# Patient Record
Sex: FEMALE | Race: WHITE
[De-identification: names, ages, dates, MRNs, and addresses within clinical notes are randomized per-mention and may not be internally consistent; named-entity substitution may affect disease eponyms.]

---

## 2019-11-10 ENCOUNTER — HOSPITAL ENCOUNTER (INPATIENT)
Dept: HOSPITAL 72 - EMR | Age: 24
LOS: 2 days | Discharge: HOME | DRG: 301 | End: 2019-11-12
Payer: COMMERCIAL

## 2019-11-10 VITALS — BODY MASS INDEX: 21.26 KG/M2 | WEIGHT: 120 LBS | HEIGHT: 63 IN

## 2019-11-10 VITALS — SYSTOLIC BLOOD PRESSURE: 138 MMHG | DIASTOLIC BLOOD PRESSURE: 92 MMHG

## 2019-11-10 DIAGNOSIS — T38.4X5A: ICD-10-CM

## 2019-11-10 DIAGNOSIS — N80.9: ICD-10-CM

## 2019-11-10 DIAGNOSIS — I82.441: Primary | ICD-10-CM

## 2019-11-10 DIAGNOSIS — M79.7: ICD-10-CM

## 2019-11-10 DIAGNOSIS — R06.02: ICD-10-CM

## 2019-11-10 PROCEDURE — 96361 HYDRATE IV INFUSION ADD-ON: CPT

## 2019-11-10 PROCEDURE — 81025 URINE PREGNANCY TEST: CPT

## 2019-11-10 PROCEDURE — 93971 EXTREMITY STUDY: CPT

## 2019-11-10 PROCEDURE — 36415 COLL VENOUS BLD VENIPUNCTURE: CPT

## 2019-11-10 PROCEDURE — 85610 PROTHROMBIN TIME: CPT

## 2019-11-10 PROCEDURE — 94640 AIRWAY INHALATION TREATMENT: CPT

## 2019-11-10 PROCEDURE — 99285 EMERGENCY DEPT VISIT HI MDM: CPT

## 2019-11-10 PROCEDURE — 85730 THROMBOPLASTIN TIME PARTIAL: CPT

## 2019-11-10 PROCEDURE — 80048 BASIC METABOLIC PNL TOTAL CA: CPT

## 2019-11-10 PROCEDURE — 96360 HYDRATION IV INFUSION INIT: CPT

## 2019-11-10 PROCEDURE — 80053 COMPREHEN METABOLIC PANEL: CPT

## 2019-11-10 PROCEDURE — 85025 COMPLETE CBC W/AUTO DIFF WBC: CPT

## 2019-11-10 PROCEDURE — 71275 CT ANGIOGRAPHY CHEST: CPT

## 2019-11-10 PROCEDURE — 96372 THER/PROPH/DIAG INJ SC/IM: CPT

## 2019-11-10 PROCEDURE — 81003 URINALYSIS AUTO W/O SCOPE: CPT

## 2019-11-10 PROCEDURE — 94664 DEMO&/EVAL PT USE INHALER: CPT

## 2019-11-10 NOTE — EMERGENCY ROOM REPORT
History of Present Illness


General


Chief Complaint:  General Complaint


Source:  Patient





Present Illness


HPI


24-year-old female presents ED for evaluation.  States she has been having 

right calf pain for the last 5 days.  Started after a flight.  States she feels 

that the calf is slightly more swollen than the left calf.  Pain is dull, 7 out 

of 10, nonradiating.  Denies any fall or injury.  Denies chest pain or 

shortness of breath.  No other aggravating relieving factors.  Denies any other 

associated symptoms


Allergies:  


Coded Allergies:  


     No Known Allergies (Unverified , 11/10/19)





Patient History


Past Medical History:  none


Past Surgical History:  none


Pertinent Family History:  none


Social History:  Denies: smoking, alcohol use, drug use


Last Menstrual Period:  birth control


Pregnant Now:  No


Immunizations:  UTD


Reviewed Nursing Documentation:  PMH: Agreed; PSxH: Agreed





Nursing Documentation-PMH


Past Medical History:  No History, Except For





Review of Systems


All Other Systems:  negative except mentioned in HPI





Physical Exam





Vital Signs








  Date Time  Temp Pulse Resp B/P (MAP) Pulse Ox O2 Delivery O2 Flow Rate FiO2


 


11/10/19 22:54 98.8 95 16 138/92 (107) 97 Room Air  








Sp02 EP Interpretation:  reviewed, normal


General Appearance:  no apparent distress, alert, GCS 15, non-toxic


Head:  normocephalic


Eyes:  bilateral eye normal inspection, bilateral eye PERRL


ENT:  normal ENT inspection


Neck:  normal inspection


Respiratory:  chest non-tender, lungs clear, normal breath sounds, speaking 

full sentences


Cardiovascular #1:  regular rate, rhythm, no edema


Gastrointestinal:  normal inspection


Rectal:  deferred


Genitourinary:  no CVA tenderness


Musculoskeletal:  calf tenderness - right


Neurologic:  alert, oriented x3, responsive, motor strength/tone normal, 

sensory intact, speech normal


Psychiatric:  normal inspection


Skin:  no rash


Lymphatic:  normal inspection





Medical Decision Making


Diagnostic Impression:  


 Primary Impression:  


 DVT (deep venous thrombosis)


 Qualified Codes:  I82.441 - Acute embolism and thrombosis of right tibial vein


ER Course


Hospital Course 


24-year-old female presents ED with R leg pain and swelling.  





Differential diagnoses include: DVT, cellulitis, contusion, abscess





Clinical course


Patient placed on stretcher after initial history and physical I ordered venous 

Duplex





Venous Duplex shows acute thrombus in R posterior tibialis


Labs reviewed- electrolytes okay, hemoglobin/hematocrit stable, coags ok





Patient given Lovenox in ED. patient will be admitted to Dr Washington's service. 





I.  I feel this is a highly complex case requiring extensive working including 

EKG/Rhythm strip, Xray/CT/US, Blood/urine lab work, repeat exams while in ED, 

and administration of strong opiates/narcotics for pain control, admission to 

hospital or close patient follow up.  





Diagnosis - DVT





admitted to floor in serious condition





Labs








Test


  11/11/19


00:19


 


White Blood Count


  9.6 K/UL


(4.8-10.8)


 


Red Blood Count


  4.84 M/UL


(4.20-5.40)


 


Hemoglobin


  15.2 G/DL


(12.0-16.0)


 


Hematocrit


  42.6 %


(37.0-47.0)


 


Mean Corpuscular Volume 88 FL (80-99) 


 


Mean Corpuscular Hemoglobin


  31.4 PG


(27.0-31.0)


 


Mean Corpuscular Hemoglobin


Concent 35.7 G/DL


(32.0-36.0)


 


Red Cell Distribution Width


  10.9 %


(11.6-14.8)


 


Platelet Count


  313 K/UL


(150-450)


 


Mean Platelet Volume


  6.8 FL


(6.5-10.1)


 


Neutrophils (%) (Auto)


  55.0 %


(45.0-75.0)


 


Lymphocytes (%) (Auto)


  34.1 %


(20.0-45.0)


 


Monocytes (%) (Auto)


  8.5 %


(1.0-10.0)


 


Eosinophils (%) (Auto)


  0.5 %


(0.0-3.0)


 


Basophils (%) (Auto)


  1.9 %


(0.0-2.0)


 


Prothrombin Time


  11.0 SEC


(9.30-11.50)


 


Prothromb Time International


Ratio 1.0 (0.9-1.1) 


 


 


Activated Partial


Thromboplast Time 27 SEC (23-33) 


 


 


Urine Color Pale yellow 


 


Urine Appearance Clear 


 


Urine pH 7 (4.5-8.0) 


 


Urine Specific Gravity


  1.005


(1.005-1.035)


 


Urine Protein


  Negative


(NEGATIVE)


 


Urine Glucose (UA)


  Negative


(NEGATIVE)


 


Urine Ketones


  Negative


(NEGATIVE)


 


Urine Blood


  Negative


(NEGATIVE)


 


Urine Nitrite


  Negative


(NEGATIVE)


 


Urine Bilirubin


  Negative


(NEGATIVE)


 


Urine Urobilinogen


  Normal MG/DL


(0.0-1.0)


 


Urine Leukocyte Esterase


  Negative


(NEGATIVE)


 


Urine HCG, Qualitative


  Negative


(NEGATIVE)


 


Sodium Level


  142 MMOL/L


(136-145)


 


Potassium Level


  3.5 MMOL/L


(3.5-5.1)


 


Chloride Level


  106 MMOL/L


()


 


Carbon Dioxide Level


  29 MMOL/L


(21-32)


 


Anion Gap


  7 mmol/L


(5-15)


 


Blood Urea Nitrogen


  11 mg/dL


(7-18)


 


Creatinine


  0.8 MG/DL


(0.55-1.30)


 


Estimat Glomerular Filtration


Rate > 60 mL/min


(>60)


 


Glucose Level


  89 MG/DL


()


 


Calcium Level


  9.2 MG/DL


(8.5-10.1)


 


Total Bilirubin


  0.5 MG/DL


(0.2-1.0)


 


Aspartate Amino Transf


(AST/SGOT) 15 U/L (15-37) 


 


 


Alanine Aminotransferase


(ALT/SGPT) 20 U/L (12-78) 


 


 


Alkaline Phosphatase


  43 U/L


()


 


Total Protein


  8.1 G/DL


(6.4-8.2)


 


Albumin


  4.3 G/DL


(3.4-5.0)


 


Globulin 3.8 g/dL 


 


Albumin/Globulin Ratio 1.1 (1.0-2.7) 








CT/MRI/US Diagnostic Results


CT/MRI/US Diagnostic Results :  


   Imaging Test Ordered:  Venous Duplex RLE


   Impression


DVT posterior tibialis





Last Vital Signs








  Date Time  Temp Pulse Resp B/P (MAP) Pulse Ox O2 Delivery O2 Flow Rate FiO2


 


11/10/19 23:05  95 16   Room Air  


 


11/10/19 23:05 98.8   138/92 97   








Status:  improved


Disposition:  ADMITTED AS INPATIENT


Condition:  Serious











Chuck Decker MD Nov 10, 2019 23:52 Returned call, no answer unable to leave message

## 2019-11-10 NOTE — NUR
ED Nurse Note:

Walk-in patient presents alone, with complaints of right leg cramping and 
soreness since the fifith. Patient is ambulatory with steady gait and A&Ox4. 
Will continue to monitor.

## 2019-11-11 VITALS — SYSTOLIC BLOOD PRESSURE: 128 MMHG | DIASTOLIC BLOOD PRESSURE: 93 MMHG

## 2019-11-11 VITALS — DIASTOLIC BLOOD PRESSURE: 78 MMHG | SYSTOLIC BLOOD PRESSURE: 121 MMHG

## 2019-11-11 VITALS — SYSTOLIC BLOOD PRESSURE: 125 MMHG | DIASTOLIC BLOOD PRESSURE: 73 MMHG

## 2019-11-11 VITALS — DIASTOLIC BLOOD PRESSURE: 67 MMHG | SYSTOLIC BLOOD PRESSURE: 114 MMHG

## 2019-11-11 VITALS — SYSTOLIC BLOOD PRESSURE: 125 MMHG | DIASTOLIC BLOOD PRESSURE: 70 MMHG

## 2019-11-11 VITALS — DIASTOLIC BLOOD PRESSURE: 77 MMHG | SYSTOLIC BLOOD PRESSURE: 117 MMHG

## 2019-11-11 VITALS — SYSTOLIC BLOOD PRESSURE: 129 MMHG | DIASTOLIC BLOOD PRESSURE: 79 MMHG

## 2019-11-11 LAB
ADD MANUAL DIFF: NO
ALBUMIN SERPL-MCNC: 4.3 G/DL (ref 3.4–5)
ALBUMIN/GLOB SERPL: 1.1 {RATIO} (ref 1–2.7)
ALP SERPL-CCNC: 43 U/L (ref 46–116)
ALT SERPL-CCNC: 20 U/L (ref 12–78)
ANION GAP SERPL CALC-SCNC: 7 MMOL/L (ref 5–15)
APPEARANCE UR: CLEAR
APTT BLD: 27 SEC (ref 23–33)
APTT PPP: (no result) S
AST SERPL-CCNC: 15 U/L (ref 15–37)
BASOPHILS NFR BLD AUTO: 1.9 % (ref 0–2)
BILIRUB SERPL-MCNC: 0.5 MG/DL (ref 0.2–1)
BUN SERPL-MCNC: 11 MG/DL (ref 7–18)
CALCIUM SERPL-MCNC: 9.2 MG/DL (ref 8.5–10.1)
CHLORIDE SERPL-SCNC: 106 MMOL/L (ref 98–107)
CO2 SERPL-SCNC: 29 MMOL/L (ref 21–32)
CREAT SERPL-MCNC: 0.8 MG/DL (ref 0.55–1.3)
EOSINOPHIL NFR BLD AUTO: 0.5 % (ref 0–3)
ERYTHROCYTE [DISTWIDTH] IN BLOOD BY AUTOMATED COUNT: 10.9 % (ref 11.6–14.8)
GLOBULIN SER-MCNC: 3.8 G/DL
GLUCOSE UR STRIP-MCNC: NEGATIVE MG/DL
HCT VFR BLD CALC: 42.6 % (ref 37–47)
HGB BLD-MCNC: 15.2 G/DL (ref 12–16)
INR PPP: 1 (ref 0.9–1.1)
KETONES UR QL STRIP: NEGATIVE
LEUKOCYTE ESTERASE UR QL STRIP: NEGATIVE
LYMPHOCYTES NFR BLD AUTO: 34.1 % (ref 20–45)
MCV RBC AUTO: 88 FL (ref 80–99)
MONOCYTES NFR BLD AUTO: 8.5 % (ref 1–10)
NEUTROPHILS NFR BLD AUTO: 55 % (ref 45–75)
NITRITE UR QL STRIP: NEGATIVE
PH UR STRIP: 7 [PH] (ref 4.5–8)
PLATELET # BLD: 313 K/UL (ref 150–450)
POTASSIUM SERPL-SCNC: 3.5 MMOL/L (ref 3.5–5.1)
PROT UR QL STRIP: NEGATIVE
RBC # BLD AUTO: 4.84 M/UL (ref 4.2–5.4)
SODIUM SERPL-SCNC: 142 MMOL/L (ref 136–145)
SP GR UR STRIP: 1 (ref 1–1.03)
UROBILINOGEN UR-MCNC: NORMAL MG/DL (ref 0–1)
WBC # BLD AUTO: 9.6 K/UL (ref 4.8–10.8)

## 2019-11-11 RX ADMIN — ENOXAPARIN SODIUM SCH MG: 300 INJECTION INTRAVENOUS; SUBCUTANEOUS at 20:57

## 2019-11-11 RX ADMIN — TRAMADOL HYDROCHLORIDE PRN MG: 50 TABLET, FILM COATED ORAL at 11:26

## 2019-11-11 RX ADMIN — TRAMADOL HYDROCHLORIDE PRN MG: 50 TABLET, FILM COATED ORAL at 20:18

## 2019-11-11 NOTE — NUR
NURSE NOTES:

Report given by Jodi in ED. Patient arrived by ramón at 0200. Patient is awake, alert 
and oriented x4. No signs of distress or SOB at this time. C/O RLE pain 7/10. Belongings 
checked with patient, form signed by patient. Skin intact but RLE swelling noted. Patient 
has medication at bedside but refused to have them sent to pharmacy. Left AC 20g IV intact 
and patent. Bed locked and in lowest position. Call light in reach. Called MD for admission 
orders. Waiting for callback. Will continue to monitor. 



VS: Temp - 97.7   HR: 101   RR: 20   BP: 121/78   O2 100%

-------------------------------------------------------------------------------

Addendum: 11/11/19 at 0356 by ANGELA LINDSEY RN

-------------------------------------------------------------------------------

Educated patient regarding medication interactions

## 2019-11-11 NOTE — HISTORY AND PHYSICAL REPORT
DATE OF ADMISSION:  11/11/2019

TIME SEEN:  9 a.m.



CONSULTANTS:  Dr. Pavon.



CHIEF COMPLAINT:  Right lower extremity swelling, DVT.



BRIEF HISTORY:  This is a 24-year-old female, who [place] from [place]

[place], apparently 7 days ago came in with some leg pain, ______ anyway did

not see doctor, right leg started swelling, slightly tender over the last

couple days, came to [facility], diagnosed with right lower extremity DVT,

admitted to medical floor for further treatment.  Currently, calm in bed,

actually slightly anxious in bed, wants to leave, no complaints.



REVIEW OF SYSTEMS:  No chest pain.  Slight short of breath.  No nausea,

vomiting, or diarrhea.



PAST MEDICAL HISTORY:  Includes fibromyalgia and endometriosis.



PAST SURGICAL HISTORY:  Nose surgery in 2015.



MEDICATIONS:  Include enoxaparin, Tylenol, Proventil, and Zofran.



ALLERGIES:  Denies.



SOCIAL HISTORY:  No smoking.  No alcohol.  Positive marijuana use.

 



MEDICATIONS:  She takes PCP.



PHYSICAL EXAMINATION:

GENERAL:  Calm in bed, oriented x3, no acute distress.

VITAL SIGNS:  Temperature 97, pulse 92, respiratory rate 18, blood pressure

117/77

CARDIOVASCULAR:  No murmur.

LUNGS:  Distant and clear.

ABDOMEN:  Positive bowel sound positive.  Nontender.  Nondistended.

EXTREMITIES:  No cyanosis, or edema.

NEUROLOGIC:  The patient moves all extremities, slightly weak.



LABORATORY DATA:  Labs, at this time, show CBC is normal.  BMP, alkaline

phosphatase 43, otherwise normal.  INR is 1.0, PTT is 27.  Urinalysis is

negative.



ASSESSMENT:

1. Right lower extremity DVT.

2. Shortness of breath.

3. Fibromyalgia.

4. Endometriosis.



PLAN:

1. Pain control.

2. Anticoagulate per Hematology.

3. CBC and BMP in the morning.

4. PT and dietary evaluation.









  ______________________________________________

  Senthil Washington D.O.





DR:  Ángel

D:  11/11/2019 10:02

T:  11/11/2019 15:51

JOB#:  [id number]

CC:

## 2019-11-11 NOTE — DIAGNOSTIC IMAGING REPORT
Indication: Right lower extremity pain and swelling.

 

Technique: Duplex Doppler imaging performed from the right common femoral vein to the

popliteal vein.

 

FINDINGS:

 

Normal compressibility demonstrated from the common femoral vein to the popliteal

vein. Respiratory phasicity and good augmentation demonstrated on waveform analysis.

There is some thrombus noted within one of the branches of the posterior tibial vein

below the calf. The posterior tibial vein appears duplicated in the right leg.

 

IMPRESSION:

 

No evidence of DVT from the popliteal vein through the common femoral veins

bilaterally.

 

One of the branches of the posterior tibial vein appears thrombosed.

## 2019-11-11 NOTE — NUR
NURSE NOTES:

Patient c/o pain in the back, around scapula area upon taking a breath, also slight 
vibration in the right leg, CN was made aware, MD's were notified as well. Vitals are 98.1, 
sat 100% on room air, , 135/94.

## 2019-11-11 NOTE — NUR
NURSE NOTES:

Received patient from Helen Serna. patient is awake in bed. Complaining of Nausea, PRN 
zofran administered. Refusing to have right side rail up. reminded Re: safety precautions. 
call bell within patients reached. will follow.

## 2019-11-11 NOTE — NUR
NURSE NOTES:

Received patient in bed, awake, alert, oriented, may ambulate, has steady gate, IV site is 
clean dry and intact, VSS afebrile, call light is within reach, bed is in low position, 
locked and alarm is on, will continue to monitor for comfort and safety.

## 2019-11-12 VITALS — SYSTOLIC BLOOD PRESSURE: 121 MMHG | DIASTOLIC BLOOD PRESSURE: 75 MMHG

## 2019-11-12 VITALS — SYSTOLIC BLOOD PRESSURE: 120 MMHG | DIASTOLIC BLOOD PRESSURE: 85 MMHG

## 2019-11-12 VITALS — SYSTOLIC BLOOD PRESSURE: 116 MMHG | DIASTOLIC BLOOD PRESSURE: 64 MMHG

## 2019-11-12 LAB
ADD MANUAL DIFF: NO
ANION GAP SERPL CALC-SCNC: 7 MMOL/L (ref 5–15)
BASOPHILS NFR BLD AUTO: 1.1 % (ref 0–2)
BUN SERPL-MCNC: 9 MG/DL (ref 7–18)
CALCIUM SERPL-MCNC: 9.3 MG/DL (ref 8.5–10.1)
CHLORIDE SERPL-SCNC: 106 MMOL/L (ref 98–107)
CO2 SERPL-SCNC: 26 MMOL/L (ref 21–32)
CREAT SERPL-MCNC: 0.8 MG/DL (ref 0.55–1.3)
EOSINOPHIL NFR BLD AUTO: 1.1 % (ref 0–3)
ERYTHROCYTE [DISTWIDTH] IN BLOOD BY AUTOMATED COUNT: 10.4 % (ref 11.6–14.8)
HCT VFR BLD CALC: 42.6 % (ref 37–47)
HGB BLD-MCNC: 14.9 G/DL (ref 12–16)
LYMPHOCYTES NFR BLD AUTO: 42.9 % (ref 20–45)
MCV RBC AUTO: 90 FL (ref 80–99)
MONOCYTES NFR BLD AUTO: 9.2 % (ref 1–10)
NEUTROPHILS NFR BLD AUTO: 45.6 % (ref 45–75)
PLATELET # BLD: 282 K/UL (ref 150–450)
POTASSIUM SERPL-SCNC: 3.7 MMOL/L (ref 3.5–5.1)
RBC # BLD AUTO: 4.74 M/UL (ref 4.2–5.4)
SODIUM SERPL-SCNC: 139 MMOL/L (ref 136–145)
WBC # BLD AUTO: 7.2 K/UL (ref 4.8–10.8)

## 2019-11-12 RX ADMIN — ENOXAPARIN SODIUM SCH MG: 300 INJECTION INTRAVENOUS; SUBCUTANEOUS at 09:21

## 2019-11-12 NOTE — DIAGNOSTIC IMAGING REPORT
Indication: Chest pain

 

Technique: Continuous helical transaxial imaging of the chest was obtained from the

thoracic inlet to the upper abdomen during rapid intravenous contrast administration.

Arterial phase of enhancement obtained. Coronal 2-D reformats were also obtained and

maximum intensity projection images in multiple planes. Study obtained in a Siemens

sensation 64 slice CT. Automatic Exposure Control was utilized.

 

 

Total Dose length Product (DLP):  347.3 mGycm

 

CT Dose Index Volume (CTDIvol):   7.4 mGy

 

Comparison: None

 

Findings: The pulmonary artery is well opacified and shows no filling defects. There

is no adenopathy, pleural or pericardial effusions are identified. There is no aortic

dissection or aneurysm identified within the chest. The lungs are clear.  Visualized

part of the upper abdomen is unremarkable. There is a prominent pectus excavatum

present.

 

IMPRESSION:

 

No evidence of pulmonary embolus, aortic dissection or aneurysm.

 

Pectus excavatum

 

 

 

The CT scanner at [facility] is accredited by the American College of

Radiology and the scans are performed using dose optimization techniques as

appropriate to a performed exam including Automatic Exposure control.

## 2019-11-12 NOTE — GENERAL PROGRESS NOTE
Assessment/Plan


Problem List:  


(1) DVT (deep venous thrombosis)


ICD Codes:  I82.409 - Acute embolism and thrombosis of unspecified deep veins 

of unspecified lower extremity


SNOMED:  [id number]


Qualifiers:  


   Qualified Codes:  I82.441 - Acute embolism and thrombosis of right tibial 

vein


Status:  stable, progressing


Assessment/Plan:


heme f/u dc if clear by heme





Subjective


Constitutional:  Reports: weakness


Allergies:  


Coded Allergies:  


     No Known Allergies (Unverified , 11/10/19)


All Systems:  reviewed and negative except above


Subjective


calm in bed wants to leave





Objective





Last 24 Hour Vital Signs








  Date Time  Temp Pulse Resp B/P (MAP) Pulse Ox O2 Delivery O2 Flow Rate FiO2


 


11/12/19 08:38  96 16  95 Room Air  21


 


11/12/19 08:36      Room Air  


 


11/12/19 08:35 98.4 95 16 120/85 (97) 95   


 


11/12/19 04:00 97.3 76 18 116/64 (81) 97   


 


11/12/19 00:00 97.9 82 20 121/75 (90) 97   


 


11/11/19 21:55      Room Air  


 


11/11/19 20:51 97.9       


 


11/11/19 20:00 97.5 73 20 125/73 (90) 96   


 


11/11/19 16:00 97.9 102 18 129/79 (96) 97   

















Intake and Output  


 


 11/11/19 11/12/19





 19:00 07:00


 


Intake Total 960 ml 


 


Balance 960 ml 


 


  


 


Intake Oral 960 ml 








Laboratory Tests


11/12/19 06:50: 


White Blood Count 7.2, Red Blood Count 4.74, Hemoglobin 14.9, Hematocrit 42.6, 

Mean Corpuscular Volume 90, Mean Corpuscular Hemoglobin 31.5H, Mean Corpuscular 

Hemoglobin Concent 35.1, Red Cell Distribution Width 10.4L, Platelet Count 282, 

Mean Platelet Volume 7.0, Neutrophils (%) (Auto) 45.6, Lymphocytes (%) (Auto) 

42.9, Monocytes (%) (Auto) 9.2, Eosinophils (%) (Auto) 1.1, Basophils (%) (Auto

) 1.1, Sodium Level 139, Potassium Level 3.7, Chloride Level 106, Carbon 

Dioxide Level 26, Anion Gap 7, Blood Urea Nitrogen 9, Creatinine 0.8, Estimat 

Glomerular Filtration Rate > 60, Glucose Level 88, Calcium Level 9.3


Height (Feet):  5


Height (Inches):  3.00


Weight (Pounds):  120


General Appearance:  alert


EENT:  normal ENT inspection


Neck:  normal alignment


Cardiovascular:  normal peripheral pulses, normal rate, regular rhythm


Respiratory/Chest:  chest wall non-tender, lungs clear, normal breath sounds


Abdomen:  normal bowel sounds, non tender, soft


Extremities:  normal inspection


Edema:  no edema noted Arm (L), no edema noted Arm (R), no edema noted Leg (L), 

no edema noted Leg (R), no edema noted Pedal (L), no edema noted Pedal (R), no 

edema noted Generalized


Neurologic:  responsive, motor weakness


Skin:  normal pigmentation, warm/dry











Senthil Washington DO Nov 12, 2019 14:36

## 2019-11-12 NOTE — CONSULTATION
Consult Note


Consult Note


HEMATOLOGY/ ONCOLOGY CONSULTATION





DATE OF CONSULTATION: 11/11/2019





LISSA STALLINGS: Dr. URVASHI Washington





REASON FOR REFERRAL: DVT





PRESENT HISTORY OF ILLNESS:  This is a 24-year-old female, who is visiting from 

[place] apparently 7 days ago came in with some leg pain not see doctor, right 

leg swelling, slightly tender over the last couple days, came to [place], 

diagnosed with right lower extremity DVT admitted to medical floor for further 

treatment. CTA Chest was done 11/12 which was negative for pulmonary embolism. 

Currently,slightly anxious in bed, wants to leave, however her flight was 

canceled she needs to go back to school for her classes. She has a notebook of 

multiple questions and is concerned about OCP's. We were consulted for 

underlying diagnosis of DVT.





PAST MEDICAL HISTORY:  Includes fibromyalgia and endometriosis.





PAST SURGICAL HISTORY:  Nose surgery in 2015.





MEDICATIONS:  Include enoxaparin, Tylenol, Proventil, and Zofran.





ALLERGIES:  Denies.





SOCIAL HISTORY:  No smoking.  No alcohol.  Positive marijuana use.





MEDICATIONS:  She takes OCP.





ROS: 12 point system done.





PHYSICAL EXAMINATION:


GENERAL:  anxious, oriented x3, no acute distress.


VITAL SIGNS:  reviewed.


CARDIOVASCULAR:  No murmur.


LUNGS:  Distant and clear.


ABDOMEN:  Positive bowel sound positive.  Nontender.  Nondistended.


EXTREMITIES:  No cyanosis, or edema.


NEUROLOGIC:  The patient moves all extremities, RLE weak.





LABORATORY DATA: INR is 1.0, PTT is 27. 





IMAGING: 


CTA ANGIO CHEST Negative for pe.


US VENOUS: Thrombus in one of the branches of the posterior tibialis veins.





ASSESSMENT:


1. Right lower extremity DVT, likely provoked due to OCP use.


--> US venous noted.


--> continue lovenox she will need 3 months of long term anticoagulation of 

Elquis or xarelto, will provide rx and d/w RN.


--> outpatient Heme follow-up


--> Discontinue Oral Contraceptives, she understands to follow -up with her OB-

GYN when she returns for alternative birth control methods including IUD no 

hormonal OCP's it is contraindicated.


2. Shortness of breath.


--> resolved. 


3. Fibromyalgia.


--> pain management.


4. Endometriosis


--> follow up outpatient GYN when she returns back home





The time the the patient was seen does not correspons to the time the note was 

written





Appreciate consultation.











Katharina Khan NP Nov 12, 2019 07:12

## 2019-11-12 NOTE — NUR
NURSE NOTES:

patient awake and alert and oriented,respirations unlabored.IV Left AC saline lock .Right 
leg appears larger than the left leg,pedal pulse to the the right foot strong.No complaint 
of pain at this time.Bed alarm is on,call light within reach.

## 2019-11-12 NOTE — NUR
NURSE NOTES:

DR Washington in to  see patient,patient discharge today.Discharge instructions given.Patient 
discharge prescription was called to [facility],patient  verified that   prescription 
is at the pharmacy and waiting to be picked up.Patient family friends here to take patient 
home.Charge Nurse verified by Doctor that she is cleared to fly .patient   understands the 
importance of taking medication Eliquis as ordered.Discharge information given. ID removed 
and ID hospital band removed.Patient has personal belonging.

## 2019-11-12 NOTE — HEMATOLOGY/ONC PROGRESS NOTE
Assessment/Plan


Assessment/Plan


1. Right lower extremity DVT, likely provoked due to OCP use.


--> US venous noted.


--> continue lovenox she will need 3 months of long term anticoagulation of 

Elquis or xarelto, will provide rx and d/w RN.


--> outpatient Heme follow-up


--> Discontinue Oral Contraceptives, she understands to follow -up with her OB-

GYN when she returns for alternative birth control methods including IUD no 

hormonal OCP's it is contraindicated.


2. Shortness of breath.


--> resolved. 


3. Fibromyalgia.


--> pain management.


4. Endometriosis


--> follow up outpatient GYN when she returns back home





The time the the patient was seen does not correspons to the time the note was 

written





Appreciate consultation.





Subjective


Allergies:  


Coded Allergies:  


     No Known Allergies (Unverified , 11/10/19)


Subjective


11/12: pt is resting in bed, no acute distress, will rx Eliquis.





Objective


Objective





Current Medications








 Medications


  (Trade)  Dose


 Ordered  Sig/Flavia


 Route


 PRN Reason  Start Time


 Stop Time Status Last Admin


Dose Admin


 


 Acetaminophen


  (Tylenol)  650 mg  Q6H  PRN


 ORAL


 Mild Pain/Temp > 100.5  11/11/19 07:15


 12/11/19 07:14   


 


 


 Albuterol Sulfate


  (Proventil MDI)  2 puff  Q6H  PRN


 INH


 Shortness of Breath  11/11/19 07:15


 12/11/19 07:14  11/11/19 14:12


 


 


 Enoxaparin Sodium


  (Lovenox)  50 mg  Q12HR


 SUBQ


   11/11/19 21:00


 12/11/19 20:59  11/11/19 20:57


 


 


 Iohexol


  (Omnipaque)  100 mg  NOW  PRN


 INJ


 Radiology Procedure  11/12/19 00:30


 11/14/19 00:25   


 


 


 Tramadol HCl


  (Ultram)  50 mg  Q8H  PRN


 ORAL


 Severe Pain (Pain Scale 7-10)  11/11/19 11:15


 11/18/19 11:14  11/11/19 20:18


 











Last 24 Hour Vital Signs








  Date Time  Temp Pulse Resp B/P (MAP) Pulse Ox O2 Delivery O2 Flow Rate FiO2


 


11/12/19 04:00 97.3 76 18 116/64 (81) 97   


 


11/12/19 00:00 97.9 82 20 121/75 (90) 97   


 


11/11/19 21:55      Room Air  


 


11/11/19 20:51 97.9       


 


11/11/19 20:00 97.5 73 20 125/73 (90) 96   


 


11/11/19 16:00 97.9 102 18 129/79 (96) 97   


 


11/11/19 14:11  99 20  99 Room Air  21


 


11/11/19 14:10  100 20  99 Room Air  21


 


11/11/19 12:00 97.9 96 18 125/70 (88) 98   


 


11/11/19 09:00      Room Air  


 


11/11/19 08:00 97.8 90 18 117/77 (90) 100   


 


11/11/19 04:00 97.9 89 18 114/67 (83) 99   


 


11/11/19 02:49      Room Air  


 


11/11/19 02:02 98.8 96 18 128/93 100 Room Air  


 


11/11/19 02:00 97.7 101 20 121/78 (92) 100   


 


11/11/19 01:20 98.8 96 18 128/93 100 Room Air  


 


11/10/19 23:05  95 16   Room Air  


 


11/10/19 23:05 98.8 95 16 138/92 97 Room Air  


 


11/10/19 22:54 98.8 95 16 138/92 (107) 97 Room Air  

















Intake and Output  


 


 11/11/19 11/12/19





 19:00 07:00


 


Intake Total 960 ml 


 


Balance 960 ml 


 


  


 


Intake Oral 960 ml 











Labs








Test


  11/11/19


00:19


 


White Blood Count


  9.6 K/UL


(4.8-10.8)


 


Red Blood Count


  4.84 M/UL


(4.20-5.40)


 


Hemoglobin


  15.2 G/DL


(12.0-16.0)


 


Hematocrit


  42.6 %


(37.0-47.0)


 


Mean Corpuscular Volume 88 FL (80-99) 


 


Mean Corpuscular Hemoglobin


  31.4 PG


(27.0-31.0)


 


Mean Corpuscular Hemoglobin


Concent 35.7 G/DL


(32.0-36.0)


 


Red Cell Distribution Width


  10.9 %


(11.6-14.8)


 


Platelet Count


  313 K/UL


(150-450)


 


Mean Platelet Volume


  6.8 FL


(6.5-10.1)


 


Neutrophils (%) (Auto)


  55.0 %


(45.0-75.0)


 


Lymphocytes (%) (Auto)


  34.1 %


(20.0-45.0)


 


Monocytes (%) (Auto)


  8.5 %


(1.0-10.0)


 


Eosinophils (%) (Auto)


  0.5 %


(0.0-3.0)


 


Basophils (%) (Auto)


  1.9 %


(0.0-2.0)


 


Prothrombin Time


  11.0 SEC


(9.30-11.50)


 


Prothromb Time International


Ratio 1.0 (0.9-1.1) 


 


 


Activated Partial


Thromboplast Time 27 SEC (23-33) 


 


 


Urine Color Pale yellow 


 


Urine Appearance Clear 


 


Urine pH 7 (4.5-8.0) 


 


Urine Specific Gravity


  1.005


(1.005-1.035)


 


Urine Protein


  Negative


(NEGATIVE)


 


Urine Glucose (UA)


  Negative


(NEGATIVE)


 


Urine Ketones


  Negative


(NEGATIVE)


 


Urine Blood


  Negative


(NEGATIVE)


 


Urine Nitrite


  Negative


(NEGATIVE)


 


Urine Bilirubin


  Negative


(NEGATIVE)


 


Urine Urobilinogen


  Normal MG/DL


(0.0-1.0)


 


Urine Leukocyte Esterase


  Negative


(NEGATIVE)


 


Urine HCG, Qualitative


  Negative


(NEGATIVE)


 


Sodium Level


  142 MMOL/L


(136-145)


 


Potassium Level


  3.5 MMOL/L


(3.5-5.1)


 


Chloride Level


  106 MMOL/L


()


 


Carbon Dioxide Level


  29 MMOL/L


(21-32)


 


Anion Gap


  7 mmol/L


(5-15)


 


Blood Urea Nitrogen


  11 mg/dL


(7-18)


 


Creatinine


  0.8 MG/DL


(0.55-1.30)


 


Estimat Glomerular Filtration


Rate > 60 mL/min


(>60)


 


Glucose Level


  89 MG/DL


()


 


Calcium Level


  9.2 MG/DL


(8.5-10.1)


 


Total Bilirubin


  0.5 MG/DL


(0.2-1.0)


 


Aspartate Amino Transf


(AST/SGOT) 15 U/L (15-37) 


 


 


Alanine Aminotransferase


(ALT/SGPT) 20 U/L (12-78) 


 


 


Alkaline Phosphatase


  43 U/L


()


 


Total Protein


  8.1 G/DL


(6.4-8.2)


 


Albumin


  4.3 G/DL


(3.4-5.0)


 


Globulin 3.8 g/dL 


 


Albumin/Globulin Ratio 1.1 (1.0-2.7) 








Height (Feet):  5


Height (Inches):  3.00


Weight (Pounds):  120


Objective


PHYSICAL EXAMINATION:


GENERAL:  anxious, oriented x3, no acute distress.


VITAL SIGNS:  reviewed.


CARDIOVASCULAR:  No murmur.


LUNGS:  Distant and clear.


ABDOMEN:  Positive bowel sound positive.  Nontender.  Nondistended.


EXTREMITIES:  No cyanosis, or edema.


NEUROLOGIC:  The patient moves all extremities, RLE weak.











Katharina Khan NP Nov 12, 2019 07:16

## 2019-11-12 NOTE — NUR
*-* INSURANCE *-*



ALL AVAILABLE CLINICALS HAVE BEEN FAXED TO:



[facility]

ADRIANNE:SOFIYA

P: [phone]

F: [phone]

## 2019-11-13 NOTE — NUR
*-* INSURANCE *-*



DISCHARGE SUMMARY HAS BEEN FAXED TO:



[facility]

ADRIANNE:SOFIYA

P: [phone]

F: [phone]

## 2019-11-13 NOTE — DISCHARGE SUMMARY
NitinRoma NP 11/13/19 1003:


Discharge Summary


Discharge Summary


_


Discharge summary DATE OF ADMISSION: 11/11/2019





DATE OF DISCHARGE: 11/12/2019








DISCHARGED BY: Dr. Thurman





REASON FOR ADMISSION: 


[]


 


CONSULTANTS:


cardiologist


neurologist


pulmonary


ID specialist


GI specialist


nephrologist


hematologist/oncologist Dr. Meade presented to emergency for evaluation 

complaining of the right calf pain for the last 5-day.


Pain started after light.  Patient felt her cough is more swollen right calf is 

more swollen than the left.  Pain reported dull nonradiating 7 out of 10 on a 

scale 1-10.  Patient denies any fall or injury.  Patient denies chest pain or 

shortness of breath.  Patient is on birth control pills.  Upon evaluation 

patient had no leukocytosis stable hemoglobin hematocrit stable electrolytes 

and renal parameters urinalysis revealed no evidence of urinary tract infection 

urine pregnancy test was negative.  Venous duplex revealed no evidence of DVT 

from the popliteal to the common femoral veins bilaterally.  1 of the branches 

of the posterior tibial vein appeared to be thrombosed.  Patient admitted to 

further management.


Doppler showed acute thrombus in the right posterior tibialis.  Patient 

received Lovenox in the emergency department and admitted for further management


 


HOSPITAL COURSE: 


[] Patient admitted to medical surgical floor.  And hematology consult was 

requested.  Patient undergone CTA of the Chest which revealed no evidence of 

pulmonary emboli aortic dissection or aneurysm.


Hematologist followed.  Oral contraceptives were discontinued.  Patient will 

need to follow-up with her OB/GYN for alternative birth control methods 

including nonhormonal IUD at this time OCPs are contraindicated.  Pain 

management was addressed as the patient has a history of fibromyalgia.





Due to rapid and unexpected improvement patient condition patient was 

discharged in 1 day.





FINAL DIAGNOSES: 


1. [] Right lower extremity DVT likely provoked to OCP use


Fibromyalgia


Endometriosis


Shortness of breath resolved





DISCHARGE MEDICATIONS:


See Medication Reconciliation list.





DISCHARGE INSTRUCTIONS:


[]


Patient was discharged home.


24 years old female follow up with primary care provider in one week.


I have been assigned to dictate discharge summary for this account. I was not 

involved in the patient's management.





Jose Pavon MD 11/14/19 5410:


Discharge Summary


Discharge Summary


_


Discussed case with radiologist





She has a distal tibial vein thrombus that is symptomatic





She needs 6 weeks of anticoagulation as opposed to 12 weeks





I called her to inform her of this info





She will f/u with heme in 2 weeks











Roma Taveras NP Nov 13, 2019 10:03


Jose Pavon MD Nov 14, 2019 17:41